# Patient Record
Sex: FEMALE | Race: BLACK OR AFRICAN AMERICAN | NOT HISPANIC OR LATINO | Employment: PART TIME | URBAN - METROPOLITAN AREA
[De-identification: names, ages, dates, MRNs, and addresses within clinical notes are randomized per-mention and may not be internally consistent; named-entity substitution may affect disease eponyms.]

---

## 2021-07-18 ENCOUNTER — HOSPITAL ENCOUNTER (EMERGENCY)
Facility: HOSPITAL | Age: 31
Discharge: HOME | End: 2021-07-18
Attending: EMERGENCY MEDICINE
Payer: MEDICAID

## 2021-07-18 VITALS
TEMPERATURE: 99 F | RESPIRATION RATE: 18 BRPM | OXYGEN SATURATION: 98 % | WEIGHT: 140 LBS | DIASTOLIC BLOOD PRESSURE: 76 MMHG | HEIGHT: 61 IN | SYSTOLIC BLOOD PRESSURE: 119 MMHG | BODY MASS INDEX: 26.43 KG/M2 | HEART RATE: 102 BPM

## 2021-07-18 DIAGNOSIS — Z20.822 COVID-19 RULED OUT: Primary | ICD-10-CM

## 2021-07-18 LAB
CTP QC/QA: YES
SARS-COV-2 RDRP RESP QL NAA+PROBE: NEGATIVE

## 2021-07-18 PROCEDURE — 99284 PR EMERGENCY DEPT VISIT,LEVEL IV: ICD-10-PCS | Mod: CS,,, | Performed by: NURSE PRACTITIONER

## 2021-07-18 PROCEDURE — 99283 EMERGENCY DEPT VISIT LOW MDM: CPT

## 2021-07-18 PROCEDURE — 99284 EMERGENCY DEPT VISIT MOD MDM: CPT | Mod: CS,,, | Performed by: NURSE PRACTITIONER

## 2021-07-18 PROCEDURE — U0002 COVID-19 LAB TEST NON-CDC: HCPCS | Performed by: EMERGENCY MEDICINE

## 2022-02-22 ENCOUNTER — HOSPITAL ENCOUNTER (EMERGENCY)
Facility: HOSPITAL | Age: 32
Discharge: HOME | End: 2022-02-22
Attending: EMERGENCY MEDICINE
Payer: MEDICAID

## 2022-02-22 VITALS
OXYGEN SATURATION: 100 % | WEIGHT: 158.75 LBS | HEIGHT: 61 IN | RESPIRATION RATE: 18 BRPM | HEART RATE: 90 BPM | SYSTOLIC BLOOD PRESSURE: 146 MMHG | BODY MASS INDEX: 29.97 KG/M2 | TEMPERATURE: 99 F | DIASTOLIC BLOOD PRESSURE: 65 MMHG

## 2022-02-22 DIAGNOSIS — Z11.52 ENCOUNTER FOR SCREENING FOR COVID-19: Primary | ICD-10-CM

## 2022-02-22 LAB
CTP QC/QA: YES
SARS-COV-2 RDRP RESP QL NAA+PROBE: NEGATIVE

## 2022-02-22 PROCEDURE — 99281 PR EMERGENCY DEPT VISIT,LEVEL I: ICD-10-PCS | Mod: CS,,, | Performed by: EMERGENCY MEDICINE

## 2022-02-22 PROCEDURE — U0002 COVID-19 LAB TEST NON-CDC: HCPCS | Performed by: EMERGENCY MEDICINE

## 2022-02-22 PROCEDURE — 99282 EMERGENCY DEPT VISIT SF MDM: CPT | Mod: 25

## 2022-02-22 PROCEDURE — 99281 EMR DPT VST MAYX REQ PHY/QHP: CPT | Mod: CS,,, | Performed by: EMERGENCY MEDICINE

## 2022-02-22 NOTE — Clinical Note
"Ana Ogden" Aliza was seen and treated in our emergency department on 2/22/2022.  She may return to work on 02/23/2022.       If you have any questions or concerns, please don't hesitate to call.      Oksana Cervantes,RN RN    "

## 2022-02-22 NOTE — ED PROVIDER NOTES
Encounter Date: 2/22/2022       History     Chief Complaint   Patient presents with    COVID-19 Concerns     No symptoms, child exposure     Patient's son was sent home from school with fever, and concerned about COVID exposure at school to her son and secondarily to her.  She has no symptoms.  She is not vaccinated.  She has had COVID twice, the 1st infection early 2020 and 2nd infection in August of 2021.         Review of patient's allergies indicates:  No Known Allergies  History reviewed. No pertinent past medical history.  History reviewed. No pertinent surgical history.  History reviewed. No pertinent family history.     Review of Systems  General: No fever.  No chills.  Eyes: No visual changes.  Head: No headache.    Integument: No rashes or lesions.  Chest: No shortness of breath.  Cardiovascular: No chest pain.  Abdomen: No abdominal pain.  No nausea or vomiting.  Urinary: No abnormal urination.  Neurologic: No focal weakness.  No numbness.  Hematologic: No easy bruising.  Endocrine: No excessive thirst or urination.    Physical Exam     Initial Vitals [02/22/22 1304]   BP Pulse Resp Temp SpO2   (!) 146/65 90 18 98.5 °F (36.9 °C) 100 %      MAP       --         Physical Exam  Appearance: No acute distress.  Skin: No rashes seen.  Good turgor.  No abrasions.  No ecchymoses.  Eyes: No conjunctival injection.  ENT: Oropharynx clear.    Chest: Clear to auscultation bilaterally.  Good air movement.  No wheezes.  No rhonchi.  Cardiovascular: Regular rate and rhythm.  No murmurs. No gallops. No rubs.  Abdomen: Soft.  Not distended.  Nontender.  No guarding.  No rebound.  Musculoskeletal: Good range of motion all joints.  No deformities.  Neck supple.  No meningismus.  Neurologic: Motor intact.  Sensation intact.  Cerebellar intact.  Cranial nerves intact.  Mental Status:  Alert and oriented x 3.  Appropriate, conversant.    ED Course   Procedures  Labs Reviewed   SARS-COV-2 RDRP GENE - Normal          Imaging  Results    None          Medications - No data to display                       Clinical Impression:   Final diagnoses:  [Z11.52] Encounter for screening for COVID-19 (Primary)          ED Disposition Condition    Discharge Stable        ED Prescriptions     None        Follow-up Information    None          Laci Babb III, MD  02/22/22 2940

## 2022-03-28 ENCOUNTER — HOSPITAL ENCOUNTER (EMERGENCY)
Facility: HOSPITAL | Age: 32
Discharge: HOME | End: 2022-03-29
Attending: EMERGENCY MEDICINE
Payer: MEDICAID

## 2022-03-28 VITALS
HEART RATE: 80 BPM | RESPIRATION RATE: 18 BRPM | TEMPERATURE: 98 F | SYSTOLIC BLOOD PRESSURE: 124 MMHG | OXYGEN SATURATION: 99 % | DIASTOLIC BLOOD PRESSURE: 68 MMHG | WEIGHT: 160 LBS | BODY MASS INDEX: 30.23 KG/M2

## 2022-03-28 DIAGNOSIS — O26.899 PELVIC PAIN DURING PREGNANCY: ICD-10-CM

## 2022-03-28 DIAGNOSIS — O20.9 BLEEDING IN EARLY PREGNANCY: Primary | ICD-10-CM

## 2022-03-28 DIAGNOSIS — R10.2 PELVIC PAIN DURING PREGNANCY: ICD-10-CM

## 2022-03-28 LAB
ALBUMIN SERPL BCP-MCNC: 3.7 G/DL (ref 3.5–5.2)
ALP SERPL-CCNC: 53 U/L (ref 55–135)
ALT SERPL W/O P-5'-P-CCNC: 11 U/L (ref 10–44)
ANION GAP SERPL CALC-SCNC: 9 MMOL/L (ref 8–16)
AST SERPL-CCNC: 20 U/L (ref 10–40)
B-HCG UR QL: POSITIVE
BASOPHILS # BLD AUTO: 0.05 K/UL (ref 0–0.2)
BASOPHILS NFR BLD: 0.6 % (ref 0–1.9)
BILIRUB SERPL-MCNC: 0.2 MG/DL (ref 0.1–1)
BILIRUB UR QL STRIP: NEGATIVE
BUN SERPL-MCNC: 9 MG/DL (ref 6–20)
CALCIUM SERPL-MCNC: 9.1 MG/DL (ref 8.7–10.5)
CAOX CRY UR QL COMP ASSIST: NORMAL
CHLORIDE SERPL-SCNC: 106 MMOL/L (ref 95–110)
CLARITY UR REFRACT.AUTO: CLEAR
CO2 SERPL-SCNC: 22 MMOL/L (ref 23–29)
COLOR UR AUTO: YELLOW
CREAT SERPL-MCNC: 0.7 MG/DL (ref 0.5–1.4)
CTP QC/QA: YES
DIFFERENTIAL METHOD: ABNORMAL
EOSINOPHIL # BLD AUTO: 0.3 K/UL (ref 0–0.5)
EOSINOPHIL NFR BLD: 3.5 % (ref 0–8)
ERYTHROCYTE [DISTWIDTH] IN BLOOD BY AUTOMATED COUNT: 13.9 % (ref 11.5–14.5)
EST. GFR  (AFRICAN AMERICAN): >60 ML/MIN/1.73 M^2
EST. GFR  (NON AFRICAN AMERICAN): >60 ML/MIN/1.73 M^2
GLUCOSE SERPL-MCNC: 102 MG/DL (ref 70–110)
GLUCOSE UR QL STRIP: NEGATIVE
HCG INTACT+B SERPL-ACNC: 2179 MIU/ML
HCT VFR BLD AUTO: 37.7 % (ref 37–48.5)
HGB BLD-MCNC: 12.3 G/DL (ref 12–16)
HGB UR QL STRIP: ABNORMAL
IMM GRANULOCYTES # BLD AUTO: 0.02 K/UL (ref 0–0.04)
IMM GRANULOCYTES NFR BLD AUTO: 0.3 % (ref 0–0.5)
KETONES UR QL STRIP: NEGATIVE
LEUKOCYTE ESTERASE UR QL STRIP: ABNORMAL
LYMPHOCYTES # BLD AUTO: 3 K/UL (ref 1–4.8)
LYMPHOCYTES NFR BLD: 37.1 % (ref 18–48)
MCH RBC QN AUTO: 26.7 PG (ref 27–31)
MCHC RBC AUTO-ENTMCNC: 32.6 G/DL (ref 32–36)
MCV RBC AUTO: 82 FL (ref 82–98)
MICROSCOPIC COMMENT: NORMAL
MONOCYTES # BLD AUTO: 0.7 K/UL (ref 0.3–1)
MONOCYTES NFR BLD: 8.4 % (ref 4–15)
NEUTROPHILS # BLD AUTO: 4 K/UL (ref 1.8–7.7)
NEUTROPHILS NFR BLD: 50.1 % (ref 38–73)
NITRITE UR QL STRIP: NEGATIVE
NRBC BLD-RTO: 0 /100 WBC
PH UR STRIP: 7 [PH] (ref 5–8)
PLATELET # BLD AUTO: 311 K/UL (ref 150–450)
PMV BLD AUTO: 9.6 FL (ref 9.2–12.9)
POTASSIUM SERPL-SCNC: 4.8 MMOL/L (ref 3.5–5.1)
PROT SERPL-MCNC: 7.2 G/DL (ref 6–8.4)
PROT UR QL STRIP: NEGATIVE
RBC # BLD AUTO: 4.6 M/UL (ref 4–5.4)
RBC #/AREA URNS AUTO: 1 /HPF (ref 0–4)
SODIUM SERPL-SCNC: 137 MMOL/L (ref 136–145)
SP GR UR STRIP: 1.01 (ref 1–1.03)
SQUAMOUS #/AREA URNS AUTO: 4 /HPF
URN SPEC COLLECT METH UR: ABNORMAL
WBC # BLD AUTO: 8 K/UL (ref 3.9–12.7)
WBC #/AREA URNS AUTO: 1 /HPF (ref 0–5)

## 2022-03-28 PROCEDURE — 81001 URINALYSIS AUTO W/SCOPE: CPT | Performed by: PHYSICIAN ASSISTANT

## 2022-03-28 PROCEDURE — 85025 COMPLETE CBC W/AUTO DIFF WBC: CPT | Performed by: PHYSICIAN ASSISTANT

## 2022-03-28 PROCEDURE — 99284 PR EMERGENCY DEPT VISIT,LEVEL IV: ICD-10-PCS | Mod: ,,, | Performed by: PHYSICIAN ASSISTANT

## 2022-03-28 PROCEDURE — 80053 COMPREHEN METABOLIC PANEL: CPT | Performed by: PHYSICIAN ASSISTANT

## 2022-03-28 PROCEDURE — 99284 EMERGENCY DEPT VISIT MOD MDM: CPT | Mod: 25

## 2022-03-28 PROCEDURE — 99284 EMERGENCY DEPT VISIT MOD MDM: CPT | Mod: ,,, | Performed by: PHYSICIAN ASSISTANT

## 2022-03-28 PROCEDURE — 81025 URINE PREGNANCY TEST: CPT | Performed by: PHYSICIAN ASSISTANT

## 2022-03-28 PROCEDURE — 84702 CHORIONIC GONADOTROPIN TEST: CPT | Performed by: PHYSICIAN ASSISTANT

## 2022-03-29 NOTE — ED PROVIDER NOTES
Encounter Date: 3/28/2022       History     Chief Complaint   Patient presents with    Vaginal Bleeding     Pt c/o vaginal bleeding and abdominal cramping x30 min. Pt states she is about 8 weeks pregnant, due date .      The patient is a 31 year old female. She is a  SAb1 who estimates that she is currently 8-9 weeks pregnant. She thinks that her LMP was around 22. She presents to the ER for an emergent evaluation due to acute pelvic cramping and vaginal bleeding. She states that around 5 pm this morning she began experiencing diffuse intermittent lower abdominal/pelvic cramps. She states that she noticed blood and clots in the toilet shortly afterwards when she used the bathroom. She denies passage of tissue. She states that the bleeding and cramping has subsided but is still mildly present. She denies any additional symptoms. She has not had pre- care. She is established with an OB/GYN physician at [facility] and has an upcoming appointment.         Review of patient's allergies indicates:  No Known Allergies  Past Medical History:   Diagnosis Date    Miscarriage      History reviewed. No pertinent surgical history.  No family history on file.  Social History     Tobacco Use    Smoking status: Unknown If Ever Smoked    Smokeless tobacco: Never Used   Substance Use Topics    Alcohol use: Not Currently    Drug use: Not Currently     Review of Systems   Constitutional: Negative for chills and fever.   HENT: Negative for congestion, rhinorrhea and sore throat.    Respiratory: Negative for shortness of breath.    Cardiovascular: Negative for chest pain.   Gastrointestinal: Positive for abdominal pain. Negative for abdominal distention, diarrhea, nausea and vomiting.   Genitourinary: Positive for pelvic pain and vaginal bleeding. Negative for decreased urine volume, difficulty urinating, dysuria, flank pain, frequency and vaginal discharge.   Musculoskeletal: Negative for back pain, gait  problem and myalgias.   Skin: Negative for color change and rash.   Allergic/Immunologic: Negative for immunocompromised state.   Neurological: Negative for dizziness, syncope, weakness, light-headedness and headaches.   Hematological: Negative for adenopathy.   Psychiatric/Behavioral: Negative for confusion. The patient is nervous/anxious.        Physical Exam     Initial Vitals [03/28/22 2009]   BP Pulse Resp Temp SpO2   129/71 86 16 98.5 °F (36.9 °C) 100 %      MAP       --         Physical Exam    Nursing note and vitals reviewed.  Constitutional: She appears well-developed and well-nourished. She is not diaphoretic. She appears distressed.   HENT:   Head: Normocephalic.   Mouth/Throat: Oropharynx is clear and moist.   Eyes: Conjunctivae are normal.   Neck: Neck supple.   Cardiovascular: Normal rate.   Pulmonary/Chest: No respiratory distress.   Abdominal: She exhibits no distension. There is no abdominal tenderness.   Benign abdomen. Unable to palpate fundal height.   There is no rebound and no guarding.   Genitourinary:    Genitourinary Comments: Female, RN present throughout entire exam as a chaperone. Normal external genitalia. Non-tender to speculum exam. Cervical OS is closed. No CMT. No infectious discharge appreciated. Blood present in vaginal canal. No tissue or clot. No adnexal mass or tenderness appreciated.      Musculoskeletal:         General: Normal range of motion.      Cervical back: Neck supple.     Neurological: She is alert and oriented to person, place, and time. She has normal strength. No sensory deficit.   Skin: Skin is warm and dry.   Psychiatric:   Tearful/anxious. Accompanied by family.           ED Course   Procedures  Labs Reviewed   CBC W/ AUTO DIFFERENTIAL - Abnormal; Notable for the following components:       Result Value    MCH 26.7 (*)     All other components within normal limits    Narrative:     Release to patient->Immediate   COMPREHENSIVE METABOLIC PANEL - Abnormal;  Notable for the following components:    CO2 22 (*)     Alkaline Phosphatase 53 (*)     All other components within normal limits    Narrative:     Release to patient->Immediate   URINALYSIS, REFLEX TO URINE CULTURE - Abnormal; Notable for the following components:    Occult Blood UA 2+ (*)     Leukocytes, UA Trace (*)     All other components within normal limits    Narrative:     Specimen Source->Urine   POCT URINE PREGNANCY - Abnormal; Notable for the following components:    POC Preg Test, Ur Positive (*)     All other components within normal limits   HCG, QUANTITATIVE    Narrative:     Release to patient->Immediate   URINALYSIS MICROSCOPIC    Narrative:     Specimen Source->Urine     Results for orders placed or performed during the hospital encounter of 03/28/22   CBC auto differential   Result Value Ref Range    WBC 8.00 3.90 - 12.70 K/uL    RBC 4.60 4.00 - 5.40 M/uL    Hemoglobin 12.3 12.0 - 16.0 g/dL    Hematocrit 37.7 37.0 - 48.5 %    MCV 82 82 - 98 fL    MCH 26.7 (L) 27.0 - 31.0 pg    MCHC 32.6 32.0 - 36.0 g/dL    RDW 13.9 11.5 - 14.5 %    Platelets 311 150 - 450 K/uL    MPV 9.6 9.2 - 12.9 fL    Immature Granulocytes 0.3 0.0 - 0.5 %    Gran # (ANC) 4.0 1.8 - 7.7 K/uL    Immature Grans (Abs) 0.02 0.00 - 0.04 K/uL    Lymph # 3.0 1.0 - 4.8 K/uL    Mono # 0.7 0.3 - 1.0 K/uL    Eos # 0.3 0.0 - 0.5 K/uL    Baso # 0.05 0.00 - 0.20 K/uL    nRBC 0 0 /100 WBC    Gran % 50.1 38.0 - 73.0 %    Lymph % 37.1 18.0 - 48.0 %    Mono % 8.4 4.0 - 15.0 %    Eosinophil % 3.5 0.0 - 8.0 %    Basophil % 0.6 0.0 - 1.9 %    Differential Method Automated    Comprehensive metabolic panel   Result Value Ref Range    Sodium 137 136 - 145 mmol/L    Potassium 4.8 3.5 - 5.1 mmol/L    Chloride 106 95 - 110 mmol/L    CO2 22 (L) 23 - 29 mmol/L    Glucose 102 70 - 110 mg/dL    BUN 9 6 - 20 mg/dL    Creatinine 0.7 0.5 - 1.4 mg/dL    Calcium 9.1 8.7 - 10.5 mg/dL    Total Protein 7.2 6.0 - 8.4 g/dL    Albumin 3.7 3.5 - 5.2 g/dL    Total  Bilirubin 0.2 0.1 - 1.0 mg/dL    Alkaline Phosphatase 53 (L) 55 - 135 U/L    AST 20 10 - 40 U/L    ALT 11 10 - 44 U/L    Anion Gap 9 8 - 16 mmol/L    eGFR if African American >60.0 >60 mL/min/1.73 m^2    eGFR if non African American >60.0 >60 mL/min/1.73 m^2   hCG, quantitative, pregnancy   Result Value Ref Range    HCG Quant 2179 See Text mIU/mL   Urinalysis, Reflex to Urine Culture Urine, Clean Catch    Specimen: Urine   Result Value Ref Range    Specimen UA Urine, Clean Catch     Color, UA Yellow Yellow, Straw, Ragini    Appearance, UA Clear Clear    pH, UA 7.0 5.0 - 8.0    Specific Gravity, UA 1.015 1.005 - 1.030    Protein, UA Negative Negative    Glucose, UA Negative Negative    Ketones, UA Negative Negative    Bilirubin (UA) Negative Negative    Occult Blood UA 2+ (A) Negative    Nitrite, UA Negative Negative    Leukocytes, UA Trace (A) Negative   Urinalysis Microscopic   Result Value Ref Range    RBC, UA 1 0 - 4 /hpf    WBC, UA 1 0 - 5 /hpf    Squam Epithel, UA 4 /hpf    Ca Oxalate Estela, UA Rare None-Moderate    Microscopic Comment SEE COMMENT    POCT urine pregnancy   Result Value Ref Range    POC Preg Test, Ur Positive (A) Negative     Acceptable Yes             Imaging Results           US OB <14 Wks, TransAbd, Single Gestation (Final result)  Result time 22 01:03:37    Final result by Felix Chacko MD (22 01:03:37)                 Impression:      Single empty intrauterine sac as described above.  Findings consistent with pregnancy of unknown location and viability noting tiny intrauterine fluid space within the lower uterine segment that may potentially represent a pseudo sac or very early IUP.  No yolk sac or fetal pole visualized.  Differential considerations include early IUP with only empty sac visible, ectopic pregnancy with pseudo sac or recent  with residual intrauterine sac in the lower uterine segment.  Estimated gestational age is 4 weeks 6 days by mean  sac diameter.  Follow-up beta hCG and close follow-up ultrasound is recommended within 2 weeks.    This report was flagged in Epic as abnormal.    Electronically signed by resident: Richard Burt  Date:    2022  Time:    00:18    Electronically signed by: Felix Chacko MD  Date:    2022  Time:    01:03             Narrative:    EXAMINATION:  US OB <14 WEEKS TRANSABDOM, SINGLE GESTATION    CLINICAL HISTORY:  Hemorrhage in early pregnancy, unspecified    TECHNIQUE:  Transabdominal sonography of the pelvis was performed, followed by transvaginal sonography to better evaluate the uterus and ovaries.    COMPARISON:  None.    FINDINGS:  The uterus measures 7.8 x 4.5 x 6.3 cm and contains a tiny intrauterine fluid space within the lower uterine segment measuring 4 mm in average diameter. Yolk sac, embryo and cardiac activity not yet identified, not unexpected with a sac of this size.    The right ovary measures approximately 3.3 x 1.9 x 2.4 cm and demonstrates a hyperechoic area demonstrating ring of fire appearance characteristic of a corpus luteal cyst..    The left ovary measures approximately 3.9 x 1.5 x 1.7 cm and is unremarkable.    Arterial and venous flow is preserved to both ovaries.    Small amount of pelvic free fluid.                                 Medications - No data to display  Medical Decision Making:   History:   Old Medical Records: I decided to obtain old medical records.  Initial Assessment:   30 yo female,  SAb1 estimates that she is currently 8 weeks pregnant, here due to acute pelvic pain and vaginal bleeding.   Differential Diagnosis:   Ectopic pregnancy, Threatened , PID, completed , incomplete , inevitable , blighted ovum, Chorioinic hemorrhage, anemia, UTI, etc   Clinical Tests:   Lab Tests: Ordered and Reviewed  Radiological Study: Ordered and Reviewed  ED Management:  Vital signs reviewed - benign   I discussed the case with the ER  attending physician   Records reviewed - rh positive   B-HCG at 2179   Pt hemodynamically stable   Fetal US completed - indeterminate viability or location of pregnancy - early IUP, missed , or ectopic pregnancy included in differential   Patient informed of results and instructed to follow up closely with her OB/GYN physician in the next 48 hours for re-evaluation and further management   Patient instructed to have repeat HCG and/or fetal US   Patient advised to take Tylenol as directed as needed for any pain/cramps   Patient advised to return to the ER promptly if worse in any way                           Clinical Impression:   Final diagnoses:  [O20.9] Bleeding in early pregnancy (Primary)  [O26.899, R10.2] Pelvic pain during pregnancy          ED Disposition Condition    Discharge Stable        ED Prescriptions     None        Follow-up Information     Follow up With Specialties Details Why Contact Kevin [facility] - Emergency Dept Emergency Medicine  If symptoms worsen in any way. Follow up with your OB/GYN physician in the next 2 days for repeat B-HCG and/or repeat fetal ultrasouns as discussed to check for miscarriage or ectopic pregnacy. [id number]  [facility] [id number] [phone]           Beni Lu PA-C  [phone]

## 2022-03-29 NOTE — ED NOTES
Patient identifiers verified and correct for Ana Abrams     LOC: The patient is awake, alert and aware of environment with an appropriate affect, the patient is oriented x 3 and speaking appropriately.   APPEARANCE: Patient appears comfortable and in no acute distress, patient is clean and well groomed.  SKIN: The skin is warm and dry, color consistent with ethnicity, patient has normal skin turgor and moist mucus membranes, skin intact, no breakdown or bruising noted.   MUSCULOSKELETAL: Patient moving all extremities spontaneously, no swelling noted.  RESPIRATORY: Airway is open and patent, respirations are spontaneous, patient has a normal effort and rate, no accessory muscle use noted, pt placed on continuous pulse ox with O2 sats noted at 97% on room air.  CARDIAC: Pt placed on cardiac monitor. Patient has a normal rate and regular rhythm, no edema noted, capillary refill < 3 seconds.   GASTRO: Soft and non tender to palpation, no distention noted, normoactive bowel sounds present in all four quadrants. Pt states bowel movements have been regular.  : Pt denies any pain or frequency with urination.  NEURO: Pt opens eyes spontaneously, behavior appropriate to situation, follows commands, facial expression symmetrical, bilateral hand grasp equal and even, purposeful motor response noted, normal sensation in all extremities when touched with a finger.

## 2023-03-18 ENCOUNTER — HOSPITAL ENCOUNTER (EMERGENCY)
Facility: HOSPITAL | Age: 33
Discharge: HOME | End: 2023-03-18
Attending: EMERGENCY MEDICINE
Payer: MEDICAID

## 2023-03-18 VITALS
HEART RATE: 94 BPM | DIASTOLIC BLOOD PRESSURE: 73 MMHG | RESPIRATION RATE: 18 BRPM | BODY MASS INDEX: 30.23 KG/M2 | TEMPERATURE: 98 F | SYSTOLIC BLOOD PRESSURE: 112 MMHG | WEIGHT: 160 LBS | OXYGEN SATURATION: 99 %

## 2023-03-18 DIAGNOSIS — Z63.4 BEREAVEMENT: ICD-10-CM

## 2023-03-18 DIAGNOSIS — Z34.91 FIRST TRIMESTER PREGNANCY: Primary | ICD-10-CM

## 2023-03-18 DIAGNOSIS — R07.9 CHEST PAIN: ICD-10-CM

## 2023-03-18 LAB
ALBUMIN SERPL BCP-MCNC: 3.5 G/DL (ref 3.5–5.2)
ALP SERPL-CCNC: 56 U/L (ref 55–135)
ALT SERPL W/O P-5'-P-CCNC: 10 U/L (ref 10–44)
ANION GAP SERPL CALC-SCNC: 9 MMOL/L (ref 8–16)
AST SERPL-CCNC: 12 U/L (ref 10–40)
B-HCG UR QL: POSITIVE
BASOPHILS # BLD AUTO: 0.04 K/UL (ref 0–0.2)
BASOPHILS NFR BLD: 0.3 % (ref 0–1.9)
BILIRUB SERPL-MCNC: 0.1 MG/DL (ref 0.1–1)
BNP SERPL-MCNC: <10 PG/ML (ref 0–99)
BUN SERPL-MCNC: 8 MG/DL (ref 6–20)
CALCIUM SERPL-MCNC: 8.9 MG/DL (ref 8.7–10.5)
CHLORIDE SERPL-SCNC: 106 MMOL/L (ref 95–110)
CO2 SERPL-SCNC: 20 MMOL/L (ref 23–29)
CREAT SERPL-MCNC: 0.6 MG/DL (ref 0.5–1.4)
CTP QC/QA: YES
DIFFERENTIAL METHOD: ABNORMAL
EOSINOPHIL # BLD AUTO: 0.2 K/UL (ref 0–0.5)
EOSINOPHIL NFR BLD: 1.8 % (ref 0–8)
ERYTHROCYTE [DISTWIDTH] IN BLOOD BY AUTOMATED COUNT: 13.4 % (ref 11.5–14.5)
EST. GFR  (NO RACE VARIABLE): >60 ML/MIN/1.73 M^2
GLUCOSE SERPL-MCNC: 98 MG/DL (ref 70–110)
HCG INTACT+B SERPL-ACNC: NORMAL MIU/ML
HCT VFR BLD AUTO: 36 % (ref 37–48.5)
HCV AB SERPL QL IA: NORMAL
HGB BLD-MCNC: 11.6 G/DL (ref 12–16)
HIV 1+2 AB+HIV1 P24 AG SERPL QL IA: NORMAL
IMM GRANULOCYTES # BLD AUTO: 0.03 K/UL (ref 0–0.04)
IMM GRANULOCYTES NFR BLD AUTO: 0.3 % (ref 0–0.5)
LYMPHOCYTES # BLD AUTO: 3.6 K/UL (ref 1–4.8)
LYMPHOCYTES NFR BLD: 30.8 % (ref 18–48)
MAGNESIUM SERPL-MCNC: 1.8 MG/DL (ref 1.6–2.6)
MCH RBC QN AUTO: 26.8 PG (ref 27–31)
MCHC RBC AUTO-ENTMCNC: 32.2 G/DL (ref 32–36)
MCV RBC AUTO: 83 FL (ref 82–98)
MONOCYTES # BLD AUTO: 1 K/UL (ref 0.3–1)
MONOCYTES NFR BLD: 8.7 % (ref 4–15)
NEUTROPHILS # BLD AUTO: 6.8 K/UL (ref 1.8–7.7)
NEUTROPHILS NFR BLD: 58.1 % (ref 38–73)
NRBC BLD-RTO: 0 /100 WBC
PLATELET # BLD AUTO: 241 K/UL (ref 150–450)
PMV BLD AUTO: 9.1 FL (ref 9.2–12.9)
POTASSIUM SERPL-SCNC: 4 MMOL/L (ref 3.5–5.1)
PROT SERPL-MCNC: 6.4 G/DL (ref 6–8.4)
RBC # BLD AUTO: 4.33 M/UL (ref 4–5.4)
SODIUM SERPL-SCNC: 135 MMOL/L (ref 136–145)
TROPONIN I SERPL DL<=0.01 NG/ML-MCNC: <0.006 NG/ML (ref 0–0.03)
WBC # BLD AUTO: 11.65 K/UL (ref 3.9–12.7)

## 2023-03-18 PROCEDURE — 85025 COMPLETE CBC W/AUTO DIFF WBC: CPT | Performed by: PHYSICIAN ASSISTANT

## 2023-03-18 PROCEDURE — 93005 ELECTROCARDIOGRAM TRACING: CPT

## 2023-03-18 PROCEDURE — 96360 HYDRATION IV INFUSION INIT: CPT

## 2023-03-18 PROCEDURE — 84702 CHORIONIC GONADOTROPIN TEST: CPT | Performed by: PHYSICIAN ASSISTANT

## 2023-03-18 PROCEDURE — 93010 ELECTROCARDIOGRAM REPORT: CPT | Mod: ,,, | Performed by: INTERNAL MEDICINE

## 2023-03-18 PROCEDURE — 83880 ASSAY OF NATRIURETIC PEPTIDE: CPT | Performed by: PHYSICIAN ASSISTANT

## 2023-03-18 PROCEDURE — 81025 URINE PREGNANCY TEST: CPT | Performed by: PHYSICIAN ASSISTANT

## 2023-03-18 PROCEDURE — 99284 EMERGENCY DEPT VISIT MOD MDM: CPT | Mod: ,,, | Performed by: PHYSICIAN ASSISTANT

## 2023-03-18 PROCEDURE — 96361 HYDRATE IV INFUSION ADD-ON: CPT

## 2023-03-18 PROCEDURE — 86803 HEPATITIS C AB TEST: CPT | Performed by: PHYSICIAN ASSISTANT

## 2023-03-18 PROCEDURE — 84484 ASSAY OF TROPONIN QUANT: CPT | Performed by: PHYSICIAN ASSISTANT

## 2023-03-18 PROCEDURE — 93010 EKG 12-LEAD: ICD-10-PCS | Mod: ,,, | Performed by: INTERNAL MEDICINE

## 2023-03-18 PROCEDURE — 99284 PR EMERGENCY DEPT VISIT,LEVEL IV: ICD-10-PCS | Mod: ,,, | Performed by: PHYSICIAN ASSISTANT

## 2023-03-18 PROCEDURE — 99284 EMERGENCY DEPT VISIT MOD MDM: CPT | Mod: 25

## 2023-03-18 PROCEDURE — 83735 ASSAY OF MAGNESIUM: CPT | Performed by: PHYSICIAN ASSISTANT

## 2023-03-18 PROCEDURE — 87389 HIV-1 AG W/HIV-1&-2 AB AG IA: CPT | Performed by: PHYSICIAN ASSISTANT

## 2023-03-18 PROCEDURE — 63600175 PHARM REV CODE 636 W HCPCS: Performed by: PHYSICIAN ASSISTANT

## 2023-03-18 PROCEDURE — 80053 COMPREHEN METABOLIC PANEL: CPT | Performed by: PHYSICIAN ASSISTANT

## 2023-03-18 RX ADMIN — SODIUM CHLORIDE, POTASSIUM CHLORIDE, SODIUM LACTATE AND CALCIUM CHLORIDE 1000 ML: 600; 310; 30; 20 INJECTION, SOLUTION INTRAVENOUS at 09:03

## 2023-03-18 SDOH — SOCIAL DETERMINANTS OF HEALTH (SDOH): DISSAPEARANCE AND DEATH OF FAMILY MEMBER: Z63.4

## 2023-03-18 NOTE — Clinical Note
"Ana Ogden" Aliza was seen and treated in our emergency department on 3/18/2023.  She may return to work on 03/20/2023.       If you have any questions or concerns, please don't hesitate to call.      Neelam Guillen PA-C"

## 2023-03-19 NOTE — ED TRIAGE NOTES
Ana Abrams, a 32 y.o. female presents to the ED w/ complaint of dizziness and light headedness x2 days. Pt also reports L sided chest pain that began as she was walking into the ER. Pt also reports generalized fatigue.     Triage note:  Chief Complaint   Patient presents with    Dizziness     C/o dizziness and lightheadedness x 2 days.    Chest Pain     5/10 sharp L anterior CP that began as she was walking into ED     Review of patient's allergies indicates:  No Known Allergies  Past Medical History:   Diagnosis Date    Miscarriage

## 2023-03-19 NOTE — ED PROVIDER NOTES
"Encounter Date: 3/18/2023       History     Chief Complaint   Patient presents with    Dizziness     C/o dizziness and lightheadedness x 2 days.    Chest Pain     5/10 sharp L anterior CP that began as she was walking into ED     Patient is a  female who presents [place] [facility] [place] [facility] [place] with multiple complaints.    Patient states that she feels "dehydrated".  She feels sick.  States that she has no energy.  She states that she lost her fiance on  from homicide.  She states "everything has change" she has decreased appetite.  Crying often.  She has decreased sleep.  She does state that she feels depressed.  She has a past her who she has been speaking to, last last week.  She has good family support.  She lost 10 lb in the past few weeks.  She states today while walking to the ED she felt dizzy, lightheaded.  She noted left-sided chest pain and difficulty breathing.  States "it is a lot" states that she feels "upset" when she keeps talking about it.  She does not have suicidal or homicidal ideations.    She denies hormone use, hemoptysis, history of PE/DVT.  She does not have history of cancer.      Review of patient's allergies indicates:  No Known Allergies  Past Medical History:   Diagnosis Date    Miscarriage      History reviewed. No pertinent surgical history.  History reviewed. No pertinent family history.  Social History     Tobacco Use    Smoking status: Unknown    Smokeless tobacco: Never   Substance Use Topics    Alcohol use: Not Currently    Drug use: Not Currently     Review of Systems   Constitutional:  Positive for activity change, appetite change and fatigue. Negative for chills, diaphoresis and fever.   HENT:  Negative for congestion and sore throat.    Respiratory:  Positive for shortness of breath.    Cardiovascular:  Positive for chest pain.   Gastrointestinal:  Negative for abdominal pain, diarrhea, nausea and vomiting.   Genitourinary:  Negative for dysuria, frequency and hematuria. "   Musculoskeletal:  Negative for back pain.   Skin:  Negative for rash.   Neurological:  Positive for dizziness, weakness and light-headedness.   Hematological:  Does not bruise/bleed easily.     Physical Exam     Initial Vitals [03/18/23 1937]   BP Pulse Resp Temp SpO2   130/63 93 16 97.9 °F (36.6 °C) 100 %      MAP       --         Physical Exam    Vitals reviewed.  Constitutional: She appears well-developed and well-nourished. She is not diaphoretic. She is cooperative.  Non-toxic appearance. She does not have a sickly appearance. She does not appear ill. No distress. Face mask in place.   HENT:   Head: Normocephalic and atraumatic.   Nose: Nose normal.   Mouth/Throat: No trismus in the jaw.   Eyes: Conjunctivae and EOM are normal.   Neck:   Normal range of motion.  Cardiovascular:  Normal rate and regular rhythm.           Pulmonary/Chest: Breath sounds normal. No accessory muscle usage. No tachypnea. No respiratory distress. She has no wheezes. She has no rhonchi. She has no rales.   Abdominal: Abdomen is soft. She exhibits no distension. There is no abdominal tenderness. There is no rebound and no guarding.   Musculoskeletal:         General: Normal range of motion.      Cervical back: Normal range of motion.     Neurological: She is alert. She has normal strength.   Skin: Skin is warm and dry. No erythema. No pallor.   Psychiatric: Her speech is not rapid and/or pressured. She is not actively hallucinating. Thought content is not paranoid and not delusional. She exhibits a depressed mood. She expresses no homicidal and no suicidal ideation. She expresses no suicidal plans and no homicidal plans.   Tearful.        ED Course   Procedures  Labs Reviewed   CBC W/ AUTO DIFFERENTIAL - Abnormal; Notable for the following components:       Result Value    Hemoglobin 11.6 (*)     Hematocrit 36.0 (*)     MCH 26.8 (*)     MPV 9.1 (*)     All other components within normal limits    Narrative:     Release to  patient->Immediate   COMPREHENSIVE METABOLIC PANEL - Abnormal; Notable for the following components:    Sodium 135 (*)     CO2 20 (*)     All other components within normal limits    Narrative:     Release to patient->Immediate   POCT URINE PREGNANCY - Abnormal; Notable for the following components:    POC Preg Test, Ur Positive (*)     All other components within normal limits   HIV 1 / 2 ANTIBODY    Narrative:     Release to patient->Immediate   HEPATITIS C ANTIBODY    Narrative:     Release to patient->Immediate   MAGNESIUM    Narrative:     Release to patient->Immediate   TROPONIN I    Narrative:     Release to patient->Immediate   B-TYPE NATRIURETIC PEPTIDE    Narrative:     Release to patient->Immediate   HCG, QUANTITATIVE    Narrative:     Release to patient->Immediate        ECG Results              EKG 12-lead (Final result)  Result time 23 10:33:38      Final result by Interface, Lab In [facility] [place] (23 10:33:38)                   Narrative:    Test Reason : R07.9,    Vent. Rate : 092 BPM     Atrial Rate : 092 BPM     P-R Int : 168 ms          QRS Dur : 088 ms      QT Int : 352 ms       P-R-T Axes : 052 076 035 degrees     QTc Int : 435 ms    Normal sinus rhythm  Normal ECG  No previous ECGs available  Confirmed by Julián QUINTEROS MD ([id number]) on 3/19/2023 10:33:29 AM    Referred By: [email or web address]   SELF           Confirmed By:Julián QUINTEROS MD                                  Imaging Results    None          Medications   lactated ringers bolus 1,000 mL (0 mLs Intravenous Stopped 3/18/23 0143)     Medical Decision Making:   History:   Old Medical Records: I decided to obtain old medical records.  Old Records Summarized: records from previous admission(s), records from clinic visits and records from another hospital.  Initial Assessment:   Patient is a  female who presents the [facility] [place] with multiple complaints.  Differential Diagnosis:   Includes but is not limited to bereavement, grievance, anxiety, stress  reaction, depression, electrolyte abnormalities, dehydration, anemia, RAY, pregnancy, less likely ACS or CHF.  She is PERC negative.  Doubt PE.  She is not suicidal or homicidal.  Independently Interpreted Test(s):   I have ordered and independently interpreted EKG Reading(s) - see summary below  Clinical Tests:   Lab Tests: Reviewed and Ordered  Radiological Study: Ordered and Reviewed  Medical Tests: Reviewed and Ordered  ED Management:  On my independent interpretation, EKG with NSR at 92 beats per minute.  Normal intervals.  No right heart straining.  No ST changes.  No STEMI.    Patient endorses signs and symptoms of bereavement. She is not suicidal.  She does not meet PEC criteria.  Additional MDM:   PERC Rule:   Age is greater than or equal to 50 = 0.0  Heart Rate is greater than or equal to 100 = 0.0  SaO2 on room air < 95% = 0.0  Unilateral leg swelling = 0.0  Hemoptysis = 0.0  Recent surgery or trauma = 0.0  Prior PE or DVT =  0.0  Hormone use = 0.00  PERC Score = 0        ED Course as of 23 0829   Sat Mar 18, 2023   2001 BP: 130/63 [CL]    Temp: 97.9 °F (36.6 °C) [CL]    Pulse: 93 [CL]    Resp: 16 [CL]    SpO2: 100 % [CL]    Preg Test, Ur(!): Positive [CL]    WBC: 11.65 [CL]   2223 Hemoglobin(!): 11.6 [CL]   2223 Platelets: 241 [CL]   2223 Sodium(!): 135 [CL]   2223 Potassium: 4.0 [CL]   2223 Chloride: 106 [CL]   2223 CO2(!): 20 [CL]   2223 Glucose: 98 [CL]   2223 Creatinine: 0.6 [CL]   2223 BILIRUBIN TOTAL: 0.1 [CL]   2223 AST: 12 [CL]    ALT: 10 [CL]    Troponin I: <0.006 [CL]    Magnesium: 1.8 [CL]   224 BNP: <10 [CL]   224 HIV 1/2 Ag/Ab: Non-reactive [CL]    Hepatitis C Ab: Non-reactive [CL]    HCG Quant: 74393 [CL]      ED Course User Index  [CL] Neelam Le, PA-C          Patient's work up unremarkable. I doubt any life threatening conditions. The father is her  fiance. We discussed bereavement patterns and I provided her with CBT. She has  great family support. Start prenatal vitamins. Follow up closely with OBGYN and PCP for close reevaluation. Return to ER precautions given, and she voices her understanding.        Clinical Impression:   Final diagnoses:  [R07.9] Chest pain  [Z34.91] First trimester pregnancy (Primary)  [Z63.4] Bereavement        ED Disposition Condition    Discharge Stable          ED Prescriptions    None       Follow-up Information       Follow up With Specialties Details Why Contact Info    Vivian Pierce MD  Schedule an appointment as soon as possible for a visit   [address] [address] [phone] (Fax)    [facility] Emergency Medicine  If symptoms worsen [id number]  [facility] [address] [phone]             Neelam Guillen PA-C  03/22/23 0829

## 2023-03-19 NOTE — DISCHARGE INSTRUCTIONS
Stay hydrated by drinking 2-3 L of water on a daily basis.    Follow up closely with OBGYN and PCP.  Return to the ER for new or worsening symptoms.